# Patient Record
Sex: MALE
[De-identification: names, ages, dates, MRNs, and addresses within clinical notes are randomized per-mention and may not be internally consistent; named-entity substitution may affect disease eponyms.]

---

## 2021-08-24 PROBLEM — Z00.00 ENCOUNTER FOR PREVENTIVE HEALTH EXAMINATION: Status: ACTIVE | Noted: 2021-08-24

## 2021-09-09 ENCOUNTER — APPOINTMENT (OUTPATIENT)
Dept: UROLOGY | Facility: CLINIC | Age: 31
End: 2021-09-09
Payer: MEDICAID

## 2021-09-09 VITALS — TEMPERATURE: 98.1 F | HEART RATE: 72 BPM | DIASTOLIC BLOOD PRESSURE: 79 MMHG | SYSTOLIC BLOOD PRESSURE: 119 MMHG

## 2021-09-09 PROCEDURE — 99204 OFFICE O/P NEW MOD 45 MIN: CPT

## 2021-09-09 NOTE — HISTORY OF PRESENT ILLNESS
[FreeTextEntry1] : 30M previous marriage \par told has low quality sperm vs 0 sperm\par very poor sperm \par had 2 surgeries with Kari Moser and 1 with Samira\par VERY poor historian\par denies genetic abnl\par reports no sperm noted\par FSH 24\par LH 12.4\par \par E2 15\par due to Krys Junior

## 2021-09-10 ENCOUNTER — NON-APPOINTMENT (OUTPATIENT)
Age: 31
End: 2021-09-10

## 2021-10-06 ENCOUNTER — NON-APPOINTMENT (OUTPATIENT)
Age: 31
End: 2021-10-06

## 2021-10-08 ENCOUNTER — NON-APPOINTMENT (OUTPATIENT)
Age: 31
End: 2021-10-08

## 2021-10-21 ENCOUNTER — APPOINTMENT (OUTPATIENT)
Dept: UROLOGY | Facility: CLINIC | Age: 31
End: 2021-10-21
Payer: MEDICAID

## 2021-10-21 VITALS
TEMPERATURE: 98 F | HEIGHT: 66 IN | BODY MASS INDEX: 27.8 KG/M2 | SYSTOLIC BLOOD PRESSURE: 120 MMHG | HEART RATE: 63 BPM | DIASTOLIC BLOOD PRESSURE: 77 MMHG | WEIGHT: 173 LBS

## 2021-10-21 PROCEDURE — 99213 OFFICE O/P EST LOW 20 MIN: CPT

## 2021-10-21 NOTE — HISTORY OF PRESENT ILLNESS
[FreeTextEntry1] : hypogonadism s/p 2 previous ?TESE\par poor historian \par now on clomid and anastrozole\par repeat labs:\par FSH 31\par LH 18\par E2 21\par no additional TT perforemd by labs \par LFTs wnl\par HCT 42\par TESE 2011 (marti) and 2013 (luli)- sertoli only path and failed mTESE

## 2021-10-21 NOTE — ASSESSMENT
[FreeTextEntry1] : hypogonadism\par repeat TT today \par review by phone - if inadequate response will start HCG\par when normalized TT will repeat SA in 3-6 months with extended sperm search\par

## 2021-10-25 LAB
TESTOST BND SERPL-MCNC: 20.4 PG/ML
TESTOSTERONE TOTAL S: 317 NG/DL

## 2022-01-25 ENCOUNTER — APPOINTMENT (OUTPATIENT)
Dept: UROLOGY | Facility: CLINIC | Age: 32
End: 2022-01-25

## 2022-02-17 RX ORDER — ANASTROZOLE TABLETS 1 MG/1
1 TABLET ORAL
Qty: 8 | Refills: 5 | Status: ACTIVE | COMMUNITY
Start: 2021-09-09 | End: 1900-01-01

## 2022-04-06 RX ORDER — CLOMIPHENE CITRATE 50 MG/1
50 TABLET ORAL
Qty: 30 | Refills: 3 | Status: ACTIVE | COMMUNITY
Start: 2021-09-09 | End: 1900-01-01

## 2022-04-29 LAB
BASOPHILS # BLD AUTO: 0.05 K/UL
BASOPHILS NFR BLD AUTO: 0.8 %
EOSINOPHIL # BLD AUTO: 0.24 K/UL
EOSINOPHIL NFR BLD AUTO: 3.9 %
FSH SERPL-MCNC: 20.5 IU/L
HCT VFR BLD CALC: 41.9 %
HGB BLD-MCNC: 13 G/DL
IMM GRANULOCYTES NFR BLD AUTO: 0.3 %
LH SERPL-ACNC: 20.9 IU/L
LYMPHOCYTES # BLD AUTO: 1.74 K/UL
LYMPHOCYTES NFR BLD AUTO: 28.3 %
MAN DIFF?: NORMAL
MCHC RBC-ENTMCNC: 27.4 PG
MCHC RBC-ENTMCNC: 31 GM/DL
MCV RBC AUTO: 88.2 FL
MONOCYTES # BLD AUTO: 0.57 K/UL
MONOCYTES NFR BLD AUTO: 9.3 %
NEUTROPHILS # BLD AUTO: 3.52 K/UL
NEUTROPHILS NFR BLD AUTO: 57.4 %
PLATELET # BLD AUTO: 325 K/UL
RBC # BLD: 4.75 M/UL
RBC # FLD: 13.2 %
TESTOST SERPL-MCNC: 226 NG/DL
WBC # FLD AUTO: 6.14 K/UL

## 2022-05-02 LAB
ALBUMIN SERPL ELPH-MCNC: 4.4 G/DL
ALP BLD-CCNC: 83 U/L
ALT SERPL-CCNC: 22 U/L
ANION GAP SERPL CALC-SCNC: 12 MMOL/L
AST SERPL-CCNC: 19 U/L
BILIRUB SERPL-MCNC: 0.2 MG/DL
BUN SERPL-MCNC: 13 MG/DL
CALCIUM SERPL-MCNC: 9.5 MG/DL
CHLORIDE SERPL-SCNC: 103 MMOL/L
CO2 SERPL-SCNC: 27 MMOL/L
CREAT SERPL-MCNC: 0.79 MG/DL
EGFR: 122 ML/MIN/1.73M2
ESTRADIOL SERPL-MCNC: 7 PG/ML
GLUCOSE SERPL-MCNC: 86 MG/DL
POTASSIUM SERPL-SCNC: 4.4 MMOL/L
PROT SERPL-MCNC: 6.9 G/DL
SODIUM SERPL-SCNC: 141 MMOL/L

## 2022-05-05 ENCOUNTER — NON-APPOINTMENT (OUTPATIENT)
Age: 32
End: 2022-05-05

## 2022-06-01 LAB
ALBUMIN SERPL ELPH-MCNC: 4.5 G/DL
ALP BLD-CCNC: 79 U/L
ALT SERPL-CCNC: 21 U/L
ANION GAP SERPL CALC-SCNC: 12 MMOL/L
AST SERPL-CCNC: 17 U/L
BASOPHILS # BLD AUTO: 0.06 K/UL
BASOPHILS NFR BLD AUTO: 0.9 %
BILIRUB SERPL-MCNC: 0.2 MG/DL
BUN SERPL-MCNC: 14 MG/DL
CALCIUM SERPL-MCNC: 9.3 MG/DL
CHLORIDE SERPL-SCNC: 104 MMOL/L
CO2 SERPL-SCNC: 26 MMOL/L
CREAT SERPL-MCNC: 0.76 MG/DL
EGFR: 123 ML/MIN/1.73M2
EOSINOPHIL # BLD AUTO: 0.19 K/UL
EOSINOPHIL NFR BLD AUTO: 2.8 %
ESTRADIOL SERPL-MCNC: 8 PG/ML
FSH SERPL-MCNC: 20.9 IU/L
GLUCOSE SERPL-MCNC: 96 MG/DL
HCT VFR BLD CALC: 40.4 %
HGB BLD-MCNC: 13.1 G/DL
IMM GRANULOCYTES NFR BLD AUTO: 0.4 %
LH SERPL-ACNC: 16.9 IU/L
LYMPHOCYTES # BLD AUTO: 1.77 K/UL
LYMPHOCYTES NFR BLD AUTO: 26 %
MAN DIFF?: NORMAL
MCHC RBC-ENTMCNC: 28.2 PG
MCHC RBC-ENTMCNC: 32.4 GM/DL
MCV RBC AUTO: 87.1 FL
MONOCYTES # BLD AUTO: 0.55 K/UL
MONOCYTES NFR BLD AUTO: 8.1 %
NEUTROPHILS # BLD AUTO: 4.2 K/UL
NEUTROPHILS NFR BLD AUTO: 61.8 %
PLATELET # BLD AUTO: 306 K/UL
POTASSIUM SERPL-SCNC: 4.2 MMOL/L
PROT SERPL-MCNC: 6.8 G/DL
RBC # BLD: 4.64 M/UL
RBC # FLD: 13.2 %
SODIUM SERPL-SCNC: 142 MMOL/L
TESTOST SERPL-MCNC: 232 NG/DL
WBC # FLD AUTO: 6.8 K/UL

## 2022-06-17 ENCOUNTER — APPOINTMENT (OUTPATIENT)
Dept: UROLOGY | Facility: CLINIC | Age: 32
End: 2022-06-17
Payer: MEDICAID

## 2022-06-17 VITALS — TEMPERATURE: 98.4 F | SYSTOLIC BLOOD PRESSURE: 121 MMHG | DIASTOLIC BLOOD PRESSURE: 81 MMHG

## 2022-06-17 DIAGNOSIS — E29.1 TESTICULAR HYPOFUNCTION: ICD-10-CM

## 2022-06-17 PROCEDURE — 99214 OFFICE O/P EST MOD 30 MIN: CPT

## 2022-06-17 RX ORDER — BLOOD-GLUCOSE METER
70 EACH MISCELLANEOUS
Qty: 1 | Refills: 0 | Status: ACTIVE | COMMUNITY
Start: 2022-06-17 | End: 1900-01-01

## 2022-06-17 RX ORDER — SYRINGE WITH NEEDLE, 1 ML 25GX5/8"
25G X 1-1/2" SYRINGE, EMPTY DISPOSABLE MISCELLANEOUS
Qty: 15 | Refills: 0 | Status: ACTIVE | COMMUNITY
Start: 2022-06-17 | End: 1900-01-01

## 2022-06-17 RX ORDER — CHORIONIC GONADOTROPIN 10000 UNIT
10000 KIT INTRAMUSCULAR
Qty: 1 | Refills: 0 | Status: ACTIVE | COMMUNITY
Start: 2022-06-17 | End: 1900-01-01

## 2022-06-19 NOTE — ASSESSMENT
[FreeTextEntry1] : Hypogonadism\par repeat  - June 2022\par Inadequate response to CLomid and Anastrazole \par Recommend start HCG \par Will repeat SA  when TT normalized.

## 2022-06-19 NOTE — HISTORY OF PRESENT ILLNESS
[None] : None [FreeTextEntry1] : 31 yr old  with hypogonadism s/p 2 previous ?TESE\par Returns for follow up with wife\par poor historian, doesn't speak English- wife translates\par Currently on  Clomid and anastrozole\par Did not start HCG, wants to discuss labs first \par repeat  - June 2022\par FSH 20.9 \par LH 16.9 \par E2 - 8\par Denies voiding \par

## 2024-03-24 NOTE — PHYSICAL EXAM
Cross cover notified of patient with pain despite acetaminophen which was not effective.  Patient has CKD stage IV.  He has chronic back and right hip pain.  Per progress note from today oxycodone 2.5 mg as needed for severe pain, but this is not currently ordered.  -Ordered oxycodone 2.5 mg every 4 hours as needed for severe pain   [FreeTextEntry1] : 3CC bilateral testes. soft.